# Patient Record
Sex: MALE | Race: WHITE | NOT HISPANIC OR LATINO | Employment: OTHER | URBAN - METROPOLITAN AREA
[De-identification: names, ages, dates, MRNs, and addresses within clinical notes are randomized per-mention and may not be internally consistent; named-entity substitution may affect disease eponyms.]

---

## 2017-08-04 ENCOUNTER — ALLSCRIPTS OFFICE VISIT (OUTPATIENT)
Dept: OTHER | Facility: OTHER | Age: 72
End: 2017-08-04

## 2017-08-04 DIAGNOSIS — R97.20 ELEVATED PROSTATE SPECIFIC ANTIGEN (PSA): ICD-10-CM

## 2017-08-04 LAB
BILIRUB UR QL STRIP: NEGATIVE
CLARITY UR: NORMAL
COLOR UR: YELLOW
GLUCOSE (HISTORICAL): 100
HGB UR QL STRIP.AUTO: NORMAL
KETONES UR STRIP-MCNC: NEGATIVE MG/DL
LEUKOCYTE ESTERASE UR QL STRIP: NEGATIVE
NITRITE UR QL STRIP: NEGATIVE
PH UR STRIP.AUTO: 5.5 [PH]
PROT UR STRIP-MCNC: NEGATIVE MG/DL
SP GR UR STRIP.AUTO: 1.02
UROBILINOGEN UR QL STRIP.AUTO: 0.2

## 2017-08-07 ENCOUNTER — GENERIC CONVERSION - ENCOUNTER (OUTPATIENT)
Dept: OTHER | Facility: OTHER | Age: 72
End: 2017-08-07

## 2017-11-21 ENCOUNTER — ALLSCRIPTS OFFICE VISIT (OUTPATIENT)
Dept: OTHER | Facility: OTHER | Age: 72
End: 2017-11-21

## 2018-01-13 NOTE — PROGRESS NOTES
Active Problems    1  Elevated prostate specific antigen (PSA) (790 93) (R97 20)    Current Meds   1  Tamsulosin HCl - 0 4 MG Oral Capsule; TAKE 1 CAPSULE Bedtime; Therapy: [id number] to ([id number])  Requested for: [id number]; Last   Rx:[id number] Ordered    Allergies    1  No Known Drug Allergies    2  No Known Environmental Allergies   3  No Known Food Allergies    Procedure    Procedure: Perla Cath Removal   PT  PRESENTS FOR A PERLA REMOVAL DUE TO RETENTION, PT  WAS IN [facility] ER  REMOVAL WENT WELL AND PT  WAS INSTRUCTED TO AVOID CAFFEINE, INCREASE FLUIDS, AND CALL THE OFFICE/ COME BACK IN IF UNABLE TO VOID OR FEELING OF INCOMPLETE BLADDER EMPTYING BY 2:30PM TODAY        Future Appointments    Date/Time Provider Specialty Site   12/12/2017 10:45 AM Marina Zuñiga MD Urology [address]   08/06/2018 09:30 AM Marina Zuñiga MD Urology [address]     Signatures   Electronically signed by : Rhina Prince, ; Nov 21 2017 12:01PM EST                       (Author)    Electronically signed by : Rani Kennedy MD; Nov 21 2017  2:47PM EST                       (Author)

## 2018-03-06 ENCOUNTER — TELEPHONE (OUTPATIENT)
Dept: UROLOGY | Facility: AMBULATORY SURGERY CENTER | Age: 73
End: 2018-03-06

## 2018-03-06 NOTE — TELEPHONE ENCOUNTER
Spoke to patient's spouse who states Pt was in [facility] ER and a catheter was inserted   Patient's spouse made a nurse visit 3/9 @ 9a for removal

## 2018-03-06 NOTE — TELEPHONE ENCOUNTER
Patient wife called said was seen in ER([facility]) said cath was placed and needs it removed  She can be reached [phone]

## 2018-03-09 ENCOUNTER — CLINICAL SUPPORT (OUTPATIENT)
Dept: UROLOGY | Facility: MEDICAL CENTER | Age: 73
End: 2018-03-09

## 2018-03-09 VITALS
WEIGHT: 155 LBS | BODY MASS INDEX: 24.91 KG/M2 | SYSTOLIC BLOOD PRESSURE: 120 MMHG | DIASTOLIC BLOOD PRESSURE: 80 MMHG | HEIGHT: 66 IN

## 2018-03-09 DIAGNOSIS — R33.9 URINE RETENTION: Primary | ICD-10-CM

## 2018-03-09 PROCEDURE — 99211 OFF/OP EST MAY X REQ PHY/QHP: CPT

## 2018-03-09 RX ORDER — TAMSULOSIN HYDROCHLORIDE 0.4 MG/1
0.4 CAPSULE ORAL
COMMUNITY

## 2018-03-09 NOTE — PROGRESS NOTES
Patient placed in supine position  Orosco catheter removed without difficulty  Pt is to increase fluids and avoid caffeine    If pt unable to void or trouble voiding to call the office by 2 PM